# Patient Record
Sex: FEMALE | Race: WHITE
[De-identification: names, ages, dates, MRNs, and addresses within clinical notes are randomized per-mention and may not be internally consistent; named-entity substitution may affect disease eponyms.]

---

## 2020-11-15 ENCOUNTER — HOSPITAL ENCOUNTER (INPATIENT)
Dept: HOSPITAL 62 - ER | Age: 60
LOS: 4 days | Discharge: HOME | DRG: 291 | End: 2020-11-19
Attending: NURSE PRACTITIONER | Admitting: INTERNAL MEDICINE
Payer: MEDICARE

## 2020-11-15 DIAGNOSIS — I11.0: Primary | ICD-10-CM

## 2020-11-15 DIAGNOSIS — E87.6: ICD-10-CM

## 2020-11-15 DIAGNOSIS — Z88.0: ICD-10-CM

## 2020-11-15 DIAGNOSIS — Z23: ICD-10-CM

## 2020-11-15 DIAGNOSIS — Z71.6: ICD-10-CM

## 2020-11-15 DIAGNOSIS — J44.1: ICD-10-CM

## 2020-11-15 DIAGNOSIS — J96.01: ICD-10-CM

## 2020-11-15 DIAGNOSIS — Z79.02: ICD-10-CM

## 2020-11-15 DIAGNOSIS — F17.200: ICD-10-CM

## 2020-11-15 DIAGNOSIS — Z79.52: ICD-10-CM

## 2020-11-15 DIAGNOSIS — I50.31: ICD-10-CM

## 2020-11-15 DIAGNOSIS — Z79.899: ICD-10-CM

## 2020-11-15 DIAGNOSIS — Z79.51: ICD-10-CM

## 2020-11-15 DIAGNOSIS — E78.5: ICD-10-CM

## 2020-11-15 DIAGNOSIS — E87.1: ICD-10-CM

## 2020-11-15 LAB
ADD MANUAL DIFF: NO
ALBUMIN SERPL-MCNC: 3.5 G/DL (ref 3.5–5)
ALP SERPL-CCNC: 104 U/L (ref 38–126)
ANION GAP SERPL CALC-SCNC: 7 MMOL/L (ref 5–19)
APPEARANCE UR: CLEAR
APTT PPP: YELLOW S
ARTERIAL BLOOD FIO2: (no result)
ARTERIAL BLOOD H2CO3: 1.72 MMOL/L (ref 1.05–1.35)
ARTERIAL BLOOD HCO3: 25.2 MMOL/L (ref 20–24)
ARTERIAL BLOOD PCO2: 57 MMHG (ref 35–45)
ARTERIAL BLOOD PH: 7.26 (ref 7.35–7.45)
ARTERIAL BLOOD PO2: 68.5 MMHG (ref 80–100)
ARTERIAL BLOOD TOTAL CO2: 27 MMOL/L (ref 21–25)
AST SERPL-CCNC: 21 U/L (ref 14–36)
BARBITURATES UR QL SCN: NEGATIVE
BASE EXCESS BLDA CALC-SCNC: -2.6 MMOL/L
BASOPHILS # BLD AUTO: 0 10^3/UL (ref 0–0.2)
BASOPHILS NFR BLD AUTO: 0.2 % (ref 0–2)
BILIRUB DIRECT SERPL-MCNC: 0.2 MG/DL (ref 0–0.4)
BILIRUB SERPL-MCNC: 0.7 MG/DL (ref 0.2–1.3)
BILIRUB UR QL STRIP: NEGATIVE
BUN SERPL-MCNC: 11 MG/DL (ref 7–20)
CALCIUM: 8 MG/DL (ref 8.4–10.2)
CHLORIDE SERPL-SCNC: 88 MMOL/L (ref 98–107)
CK MB SERPL-MCNC: 2.05 NG/ML (ref ?–4.55)
CK MB SERPL-MCNC: 2.27 NG/ML (ref ?–4.55)
CO2 SERPL-SCNC: 27 MMOL/L (ref 22–30)
EOSINOPHIL # BLD AUTO: 0 10^3/UL (ref 0–0.6)
EOSINOPHIL NFR BLD AUTO: 0 % (ref 0–6)
ERYTHROCYTE [DISTWIDTH] IN BLOOD BY AUTOMATED COUNT: 17.1 % (ref 11.5–14)
GLUCOSE SERPL-MCNC: 180 MG/DL (ref 75–110)
GLUCOSE UR STRIP-MCNC: NEGATIVE MG/DL
HCT VFR BLD CALC: 37.1 % (ref 36–47)
HGB BLD-MCNC: 12.4 G/DL (ref 12–15.5)
INR PPP: 0.93
KETONES UR STRIP-MCNC: NEGATIVE MG/DL
LYMPHOCYTES # BLD AUTO: 0.5 10^3/UL (ref 0.5–4.7)
LYMPHOCYTES NFR BLD AUTO: 6.9 % (ref 13–45)
MCH RBC QN AUTO: 32.1 PG (ref 27–33.4)
MCHC RBC AUTO-ENTMCNC: 33.3 G/DL (ref 32–36)
MCV RBC AUTO: 96 FL (ref 80–97)
METHADONE UR QL SCN: NEGATIVE
MONOCYTES # BLD AUTO: 0.3 10^3/UL (ref 0.1–1.4)
MONOCYTES NFR BLD AUTO: 3.4 % (ref 3–13)
NEUTROPHILS # BLD AUTO: 7 10^3/UL (ref 1.7–8.2)
NEUTS SEG NFR BLD AUTO: 89.5 % (ref 42–78)
NT PRO BNP: (no result) PG/ML (ref ?–125)
PCP UR QL SCN: NEGATIVE
PH UR STRIP: 5 [PH] (ref 5–9)
PLATELET # BLD: 222 10^3/UL (ref 150–450)
POTASSIUM SERPL-SCNC: 5 MMOL/L (ref 3.6–5)
PROT SERPL-MCNC: 5.9 G/DL (ref 6.3–8.2)
PROT UR STRIP-MCNC: NEGATIVE MG/DL
PROTHROMBIN TIME: 12.7 SEC (ref 11.4–15.4)
RBC # BLD AUTO: 3.85 10^6/UL (ref 3.72–5.28)
SAO2 % BLDA: 90.8 % (ref 94–98)
SP GR UR STRIP: 1.01
TOTAL CELLS COUNTED % (AUTO): 100 %
TROPONIN I SERPL-MCNC: < 0.012 NG/ML
TROPONIN I SERPL-MCNC: < 0.012 NG/ML
URINE AMPHETAMINES SCREEN: NEGATIVE
URINE BENZODIAZEPINES SCREEN: NEGATIVE
URINE COCAINE SCREEN: NEGATIVE
URINE MARIJUANA (THC) SCREEN: NEGATIVE
UROBILINOGEN UR-MCNC: NEGATIVE MG/DL (ref ?–2)
WBC # BLD AUTO: 7.8 10^3/UL (ref 4–10.5)

## 2020-11-15 PROCEDURE — 83735 ASSAY OF MAGNESIUM: CPT

## 2020-11-15 PROCEDURE — 93306 TTE W/DOPPLER COMPLETE: CPT

## 2020-11-15 PROCEDURE — 85027 COMPLETE CBC AUTOMATED: CPT

## 2020-11-15 PROCEDURE — 85610 PROTHROMBIN TIME: CPT

## 2020-11-15 PROCEDURE — 81001 URINALYSIS AUTO W/SCOPE: CPT

## 2020-11-15 PROCEDURE — 84439 ASSAY OF FREE THYROXINE: CPT

## 2020-11-15 PROCEDURE — 83935 ASSAY OF URINE OSMOLALITY: CPT

## 2020-11-15 PROCEDURE — 82803 BLOOD GASES ANY COMBINATION: CPT

## 2020-11-15 PROCEDURE — 36600 WITHDRAWAL OF ARTERIAL BLOOD: CPT

## 2020-11-15 PROCEDURE — 93005 ELECTROCARDIOGRAM TRACING: CPT

## 2020-11-15 PROCEDURE — 80061 LIPID PANEL: CPT

## 2020-11-15 PROCEDURE — 84443 ASSAY THYROID STIM HORMONE: CPT

## 2020-11-15 PROCEDURE — 94660 CPAP INITIATION&MGMT: CPT

## 2020-11-15 PROCEDURE — 83880 ASSAY OF NATRIURETIC PEPTIDE: CPT

## 2020-11-15 PROCEDURE — 90471 IMMUNIZATION ADMIN: CPT

## 2020-11-15 PROCEDURE — 80048 BASIC METABOLIC PNL TOTAL CA: CPT

## 2020-11-15 PROCEDURE — 84484 ASSAY OF TROPONIN QUANT: CPT

## 2020-11-15 PROCEDURE — 82553 CREATINE MB FRACTION: CPT

## 2020-11-15 PROCEDURE — 93010 ELECTROCARDIOGRAM REPORT: CPT

## 2020-11-15 PROCEDURE — 87040 BLOOD CULTURE FOR BACTERIA: CPT

## 2020-11-15 PROCEDURE — 82550 ASSAY OF CK (CPK): CPT

## 2020-11-15 PROCEDURE — 80053 COMPREHEN METABOLIC PANEL: CPT

## 2020-11-15 PROCEDURE — G0008 ADMIN INFLUENZA VIRUS VAC: HCPCS

## 2020-11-15 PROCEDURE — 5A09357 ASSISTANCE WITH RESPIRATORY VENTILATION, LESS THAN 24 CONSECUTIVE HOURS, CONTINUOUS POSITIVE AIRWAY PRESSURE: ICD-10-PCS | Performed by: INTERNAL MEDICINE

## 2020-11-15 PROCEDURE — 99285 EMERGENCY DEPT VISIT HI MDM: CPT

## 2020-11-15 PROCEDURE — 84300 ASSAY OF URINE SODIUM: CPT

## 2020-11-15 PROCEDURE — 82962 GLUCOSE BLOOD TEST: CPT

## 2020-11-15 PROCEDURE — 80307 DRUG TEST PRSMV CHEM ANLYZR: CPT

## 2020-11-15 PROCEDURE — 85025 COMPLETE CBC W/AUTO DIFF WBC: CPT

## 2020-11-15 PROCEDURE — 83036 HEMOGLOBIN GLYCOSYLATED A1C: CPT

## 2020-11-15 PROCEDURE — 71045 X-RAY EXAM CHEST 1 VIEW: CPT

## 2020-11-15 PROCEDURE — 85730 THROMBOPLASTIN TIME PARTIAL: CPT

## 2020-11-15 PROCEDURE — 36415 COLL VENOUS BLD VENIPUNCTURE: CPT

## 2020-11-15 PROCEDURE — 90686 IIV4 VACC NO PRSV 0.5 ML IM: CPT

## 2020-11-15 PROCEDURE — 96374 THER/PROPH/DIAG INJ IV PUSH: CPT

## 2020-11-15 RX ADMIN — FUROSEMIDE SCH MG: 10 INJECTION, SOLUTION INTRAMUSCULAR; INTRAVENOUS at 22:53

## 2020-11-15 RX ADMIN — IPRATROPIUM BROMIDE AND ALBUTEROL SULFATE SCH ML: 2.5; .5 SOLUTION RESPIRATORY (INHALATION) at 19:17

## 2020-11-15 RX ADMIN — FAMOTIDINE SCH MG: 20 TABLET, FILM COATED ORAL at 22:54

## 2020-11-15 RX ADMIN — AZITHROMYCIN MONOHYDRATE SCH MLS/HR: 500 INJECTION, POWDER, LYOPHILIZED, FOR SOLUTION INTRAVENOUS at 22:52

## 2020-11-15 NOTE — RADIOLOGY REPORT (SQ)
EXAM DESCRIPTION:  CHEST SINGLE VIEW



IMAGES COMPLETED DATE/TIME:  11/15/2020 2:06 pm



REASON FOR STUDY:  Shortness of breath



COMPARISON:  None.



EXAM PARAMETERS:  NUMBER OF VIEWS: One view.

TECHNIQUE: Single frontal radiographic view of the chest acquired.

RADIATION DOSE: NA

LIMITATIONS: Rotated towards the [place] orientation



FINDINGS:  LUNGS AND PLEURA: Few Kerley lines are present at both lung bases with pulmonary vascular 
congestion from mild fluid overload or congestive failure.

No gross pleural effusions.  No pneumothorax.

MEDIASTINUM AND HILAR STRUCTURES: No masses.  Contour normal.

HEART AND VASCULAR STRUCTURES: Mild cardiomegaly

BONES: No acute findings.

HARDWARE: None in the chest.

OTHER: No other significant finding.



IMPRESSION:  Mild interstitial pulmonary edema



TECHNICAL DOCUMENTATION:  JOB ID:  [id number]

 2011 [email or web address] All Rights Reserved



Reading location - IP/workstation name: [id number]

## 2020-11-15 NOTE — ER DOCUMENT REPORT
ED Respiratory Problem





- General


Chief Complaint: Respiratory Distress


Stated Complaint: RESPIRATORY DISTRESS


Mode of Arrival: Medic


Information source: Patient


Notes: 





This 60-year-old woman presents to the emergency department history of EMS 

called to the hotel where she was staying because of increasing shortness of 

breath and work to breathe.  EMS notes that the patient was in marked distress 

unable to maintain an oxygen saturation with O2, she was placed on CPAP at given

nebulizer treatments, Solu-Medrol 125, magnesium 2 g IV and transported to the 

emergency department.  Upon arrival to the emergency department the patient 

continued to have some increased work of breathing, she was placed on BiPAP and 

further evaluation and treatment performed.





- Related Data


Allergies/Adverse Reactions: 


                                        





Penicillins Allergy (Verified 11/15/20 14:24)


   











Past Medical History





- Social History


Smoking Status: Current Every Day Smoker


Family History: Reviewed & Not Pertinent


Pulmonary Medical History: Reports: Hx COPD





Review of Systems





- Review of Systems


-: Yes ROS unobtainable due to patient's medical condition - Poorly responsive, 

altered.





Physical Exam





- Vital signs


Vitals: 


                                        











Resp Pulse Ox


 


 18   94 


 


 11/15/20 13:40  11/15/20 13:40














- Notes


Notes: 





PHYSICAL EXAMINATION:


 


Physical Exam:


General: Ill-appearing 60-year-old woman in mild distress secondary to shortness

of breath


HEENT: NC/AT, pupils equal round and reactive to light, MM moist,nares clear, 

oropharynx clear, airway patent


Neck: supple, no adenopathy, no masses.  Good range of motion


Lungs: Mild wheezing diffusely


CVS: Tachycardic rate and rhythm no murmur gallop or rub


Abdomen: Soft, active, nontender, no masses, no hepatosplenomegaly


Ext:   Trace edema, no clubbing or cyanosis.


Neuro: Obtunded, opens eyes to name being called, does not answer questions


Skin: Intact no open lesions, no rash











Course





- Re-evaluation


Re-evalutation: 





11/15/20 18:16


Patient presented to with via EMS with respiratory distress and hypoxia.  Found 

to be hypercapnic and acidotic patient has received IV Solu-Medrol, IV 

magnesium, nebulizer treatments x3, and has improved while on BiPAP in the 

emergency department.  Chest x-ray reveals a mild congestive heart failure BNP 

is elevated she is given Lasix 20 mg IV.  This may be due to pulmonary hyperte

nsion and right heart strain, however, the patient will be brought into the 

hospital for further evaluation and treatment.





I discussed the patient with the hospitalist, Dr. Phillips will admit the patient 

for further evaluation and treatment to the [facility].





- Vital Signs


Vital signs: 


                                        











Temp Pulse Resp BP Pulse Ox


 


 97.6 F      19   133/72 H  97 


 


 11/15/20 14:13     11/15/20 17:16  11/15/20 16:16  11/15/20 17:16














- Laboratory


Result Diagrams: 


                                 11/15/20 13:57





                                 11/15/20 13:57


Laboratory results interpreted by me: 


                                        











  11/15/20 11/15/20 11/15/20





  13:46 13:57 13:57


 


RDW   17.1 H 


 


Lymph % (Auto)   6.9 L 


 


Seg Neutrophils %   89.5 H 


 


Carbonic Acid  1.72 H  


 


ABG pH  7.26 L  


 


ABG pCO2  57.0 H  


 


ABG pO2  68.5 L  


 


ABG HCO3  25.2 H  


 


ABG Total CO2  27.0 H  


 


ABG O2 Saturation  90.8 L  


 


Sodium    122.3 L


 


Chloride    88 L


 


Glucose    180 H


 


POC Glucose   


 


Calcium    8.0 L


 


NT-Pro-B Natriuret Pep   


 


Total Protein    5.9 L


 


Urine Blood   














  11/15/20 11/15/20 11/15/20





  14:01 16:00 16:41


 


RDW   


 


Lymph % (Auto)   


 


Seg Neutrophils %   


 


Carbonic Acid   


 


ABG pH   


 


ABG pCO2   


 


ABG pO2   


 


ABG HCO3   


 


ABG Total CO2   


 


ABG O2 Saturation   


 


Sodium   


 


Chloride   


 


Glucose   


 


POC Glucose  184 H  


 


Calcium   


 


NT-Pro-B Natriuret Pep    00880 H


 


Total Protein   


 


Urine Blood   SMALL H 











11/15/20 18:13


I have reviewed laboratory data and used this information for the treatment 

decisions regarding the patient.





- Diagnostic Test


Radiology reviewed: Image reviewed, Reports reviewed


Radiology results interpreted by me: 





11/15/20 18:13





                                        





Chest X-Ray  11/15/20 13:49


IMPRESSION:  Mild interstitial pulmonary edema


 














Critical Care Note





- Critical Care Note


Total time excluding time spent on procedures (mins): 60 - Critical care time 

spent obtaining history from patient or surrogate, discussions with consultants,

development of treatment plan with patient or surrogate, evaluation of patient's

response to treatment, examination of patient, ordering and performing 

treatments and interventions, ordering and review of laboratory studies, re-

evaluation of patient's condition, ordering and review of radiographic studies 

and review of old charts





Discharge





- Discharge


Clinical Impression: 


Respiratory failure


Qualifiers:


 Chronicity: acute Respiratory failure complication: hypoxia and hypercapnia 

Qualified Code(s): J96.01 - Acute respiratory failure with hypoxia; J96.02 - 

Acute respiratory failure with hypercapnia





COPD (chronic obstructive pulmonary disease)


Qualifiers:


 COPD type: COPD with acute exacerbation Qualified Code(s): J44.1 - Chronic 

obstructive pulmonary disease with (acute) exacerbation





CHF (congestive heart failure)


Qualifiers:


 Heart failure type: unspecified Heart failure chronicity: unspecified Qualified

Code(s): I50.9 - Heart failure, unspecified





Condition: Good


Disposition: ADMITTED AS INPATIENT


Admitting Provider: Alan (Hospitalist)


Unit Admitted: [facility]

## 2020-11-15 NOTE — PDOC H&P
History of Present Illness


History of Present Illness: 


ALBERT RICKETTS is a 60 year old female past medical history of COPD, who was 

brought to ED by EMS from hospital  where she is staying with her sister, after

noted to be short of breath, unfortunately  patient poor historian, does not 

provide much information except for that she is moving to [place], and she has 

COPD and history of drug abuse, she denies any cardiac history, and she was 

bought to ED because she was short of breath, as per EMS report patient was 

noted to be in moderate respiratory distress, well to maintain her O2, she was 

placed on CPAP and given nebulizer treatment, started on Solu-Medrol and IV 

magnesium, and brought to ED.  In ED she was noted to have mild hypercarbia and 

hypoxemia, with elevated proBNP this x-ray showed mild interstitial pulmonary 

edema.  Patient denies any fever, chills, chest pain, nausea, vomiting, 

diarrhea, constipation, exposure to anybody with COVID-19 infection.  Noted to 

have elevated proBNP however denies any history of CHF.





Past Medical History


Pulmonary Medical History: Reports: Chronic Obstructive Pulmonary Disease (COPD)





Social History


Smoking Status: Current Every Day Smoker





Family History


Family History: Reviewed & Not Pertinent


Parental Family History Reviewed: Yes


Children Family History Reviewed: Yes


Sibling(s) Family History Reviewed.: Yes





Medication/Allergy


Allergies/Adverse Reactions: 


                                        





Penicillins Allergy (Verified 11/15/20 14:24)


   











Review of Systems


Review of Systems: 


as per hpi





Physical Exam


Vital Signs: 


                                        











Temp Pulse Resp BP Pulse Ox


 


 97.6 F      25 H  149/76 H  98 


 


 11/15/20 14:13     11/15/20 18:16  11/15/20 18:16  11/15/20 18:16











General appearance: PRESENT: other - Wearing BiPAP, moderate respiratory 

distress.


Head exam: PRESENT: atraumatic, normocephalic


Respiratory exam: PRESENT: accessory muscle use, decreased breath sounds, 

prolonged expiratory phas, symmetrical, tachypnea.  ABSENT: rales, rhonchi, 

wheezes


Cardiovascular exam: PRESENT: RRR.  ABSENT: diastolic murmur, rubs, systolic 

murmur


GI/Abdominal exam: PRESENT: normal bowel sounds, soft.  ABSENT: distended, 

guarding, mass, organolmegaly, rebound, tenderness


Extremities exam: PRESENT: full ROM.  ABSENT: calf tenderness, clubbing, pedal 

edema


Neurological exam: PRESENT: alert, awake, oriented to person, oriented to place,

CN II-XII grossly intact.  ABSENT: motor sensory deficit


Skin exam: PRESENT: dry, intact, warm.  ABSENT: cyanosis, rash





Results


Laboratory Results: 


                                        





                                 11/15/20 13:57 





                                 11/15/20 13:57 





                                        











  11/15/20 11/15/20 11/15/20





  13:46 13:57 13:57


 


WBC   7.8 


 


RBC   3.85 


 


Hgb   12.4 


 


Hct   37.1 


 


MCV   96 


 


MCH   32.1 


 


MCHC   33.3 


 


RDW   17.1 H 


 


Plt Count   222 


 


Seg Neutrophils %   89.5 H 


 


Carbonic Acid  1.72 H  


 


HCO3/H2CO3 Ratio  14:1  


 


ABG pH  7.26 L  


 


ABG pCO2  57.0 H  


 


ABG pO2  68.5 L  


 


ABG HCO3  25.2 H  


 


ABG O2 Saturation  90.8 L  


 


ABG Base Excess  -2.6  


 


FiO2  60%  


 


Sodium    122.3 L


 


Potassium    5.0


 


Chloride    88 L


 


Carbon Dioxide    27


 


Anion Gap    7


 


BUN    11


 


Creatinine    0.92


 


Est GFR ( Amer)    > 60


 


Glucose    180 H


 


Calcium    8.0 L


 


Total Bilirubin    0.7


 


AST    21


 


Alkaline Phosphatase    104


 


Total Protein    5.9 L


 


Albumin    3.5


 


Urine Color   


 


Urine Appearance   


 


Urine pH   


 


Ur Specific Gravity   


 


Urine Protein   


 


Urine Glucose (UA)   


 


Urine Ketones   


 


Urine Blood   


 


Urine RBC (Auto)   














  11/15/20





  16:00


 


WBC 


 


RBC 


 


Hgb 


 


Hct 


 


MCV 


 


MCH 


 


MCHC 


 


RDW 


 


Plt Count 


 


Seg Neutrophils % 


 


Carbonic Acid 


 


HCO3/H2CO3 Ratio 


 


ABG pH 


 


ABG pCO2 


 


ABG pO2 


 


ABG HCO3 


 


ABG O2 Saturation 


 


ABG Base Excess 


 


FiO2 


 


Sodium 


 


Potassium 


 


Chloride 


 


Carbon Dioxide 


 


Anion Gap 


 


BUN 


 


Creatinine 


 


Est GFR (African Amer) 


 


Glucose 


 


Calcium 


 


Total Bilirubin 


 


AST 


 


Alkaline Phosphatase 


 


Total Protein 


 


Albumin 


 


Urine Color  YELLOW


 


Urine Appearance  CLEAR


 


Urine pH  5.0


 


Ur Specific Gravity  1.010


 


Urine Protein  NEGATIVE


 


Urine Glucose (UA)  NEGATIVE


 


Urine Ketones  NEGATIVE


 


Urine Blood  SMALL H


 


Urine RBC (Auto)  1








                                        











  11/15/20 11/15/20 11/15/20





  13:57 13:57 16:41


 


Creatine Kinase  66   64


 


CK-MB (CK-2)   2.05 


 


Troponin I   < 0.012 


 


NT-Pro-B Natriuret Pep   














  11/15/20





  16:41


 


Creatine Kinase 


 


CK-MB (CK-2)  2.27


 


Troponin I  < 0.012


 


NT-Pro-B Natriuret Pep  67567 H











Impressions: 


                                        





Chest X-Ray  11/15/20 13:49


IMPRESSION:  Mild interstitial pulmonary edema


 














Assessment and Plan





- Diagnosis


(1) Acute respiratory failure with hypoxemia


Is this a current diagnosis for this admission?: Yes   


Plan: 


Presented with moderate respiratory distress ABG positive for mild hypercarbia 

and hypoxia.


Revealed elevated proBNP.


Denies any underlying cardiac history.


Endorses history of nonoxygen dependent COPD.


Endorses history of heavy tobacco abuse.


Chest x-ray positive for interstitial edema.





Likely due to acute COPD and CHF exacerbation.  





Admit to IMCU, BiPAP, DuoNeb, azithromycin, IV Solu-Medrol, flutter valve, 

incentive spirometry.








(2) Acute CHF


Qualifiers: 


   Heart failure type: diastolic   Qualified Code(s): I50.31 - Acute diastolic 

(congestive) heart failure   


Is this a current diagnosis for this admission?: Yes   


Plan: 


Denies any cardiac history.  Given history of COPD and EtOH abuse likely right-

sided heart failure.


Elevated proBNP, EKG and no acute EKG changes, troponins WNL.





Admit to telemetry, 2D echo, cardiac diet, strict renal, IV Lasix, fluid 

restriction, daily weight, ACE beta-blockers.








(3) Hyponatremia


Is this a current diagnosis for this admission?: Yes   


Plan: 


Hypervolemic hyponatremia.


Likely chronic, due to underlying acute CHF exacerbation.


Fluid restriction, treat underlying CHF.  Monitor procedure.  Daily BMP.








(4) Acute exacerbation of chronic obstructive pulmonary disease (COPD)


Is this a current diagnosis for this admission?: Yes   


Plan: 


Plan as per #1.








(5) COPD (chronic obstructive pulmonary disease)


Qualifiers: 


   COPD type: COPD with acute exacerbation   Qualified Code(s): J44.1 - Chronic 

obstructive pulmonary disease with (acute) exacerbation   


Is this a current diagnosis for this admission?: Yes   





(6) Tobacco abuse


Is this a current diagnosis for this admission?: Yes   


Plan: 


Counseled on quitting.  NicoDerm patch will be provided.








- Time


Time Spent with patient: 35 or more minutes


Anticipated Discharge Disposition: Home with Home Health


Anticipated Discharge Timeframe: within 72 hours

## 2020-11-16 LAB
ADD MANUAL DIFF: NO
ALBUMIN SERPL-MCNC: 4.1 G/DL (ref 3.5–5)
ALP SERPL-CCNC: 109 U/L (ref 38–126)
ANION GAP SERPL CALC-SCNC: 7 MMOL/L (ref 5–19)
ARTERIAL BLOOD FIO2: (no result)
ARTERIAL BLOOD H2CO3: 0.98 MMOL/L (ref 1.05–1.35)
ARTERIAL BLOOD HCO3: 30.3 MMOL/L (ref 20–24)
ARTERIAL BLOOD PCO2: 32.7 MMHG (ref 35–45)
ARTERIAL BLOOD PH: 7.59 (ref 7.35–7.45)
ARTERIAL BLOOD PO2: 272.9 MMHG (ref 80–100)
ARTERIAL BLOOD TOTAL CO2: 31.3 MMOL/L (ref 21–25)
AST SERPL-CCNC: 19 U/L (ref 14–36)
BASE EXCESS BLDA CALC-SCNC: 8.5 MMOL/L
BASOPHILS # BLD AUTO: 0 10^3/UL (ref 0–0.2)
BASOPHILS NFR BLD AUTO: 0.2 % (ref 0–2)
BILIRUB DIRECT SERPL-MCNC: 0.3 MG/DL (ref 0–0.4)
BILIRUB SERPL-MCNC: 0.9 MG/DL (ref 0.2–1.3)
BUN SERPL-MCNC: 15 MG/DL (ref 7–20)
CALCIUM: 9.1 MG/DL (ref 8.4–10.2)
CHLORIDE SERPL-SCNC: 89 MMOL/L (ref 98–107)
CHOLEST SERPL-MCNC: 107.7 MG/DL (ref 0–200)
CO2 SERPL-SCNC: 33 MMOL/L (ref 22–30)
EOSINOPHIL # BLD AUTO: 0 10^3/UL (ref 0–0.6)
EOSINOPHIL NFR BLD AUTO: 0 % (ref 0–6)
ERYTHROCYTE [DISTWIDTH] IN BLOOD BY AUTOMATED COUNT: 17.1 % (ref 11.5–14)
FREE T4 (FREE THYROXINE): 1.44 NG/DL (ref 0.78–2.19)
GLUCOSE SERPL-MCNC: 133 MG/DL (ref 75–110)
HCT VFR BLD CALC: 38.4 % (ref 36–47)
HGB BLD-MCNC: 13.3 G/DL (ref 12–15.5)
LDLC SERPL DIRECT ASSAY-MCNC: < 30 MG/DL (ref ?–100)
LYMPHOCYTES # BLD AUTO: 0.4 10^3/UL (ref 0.5–4.7)
LYMPHOCYTES NFR BLD AUTO: 6.4 % (ref 13–45)
MCH RBC QN AUTO: 32.5 PG (ref 27–33.4)
MCHC RBC AUTO-ENTMCNC: 34.5 G/DL (ref 32–36)
MCV RBC AUTO: 94 FL (ref 80–97)
MONOCYTES # BLD AUTO: 0.3 10^3/UL (ref 0.1–1.4)
MONOCYTES NFR BLD AUTO: 4.4 % (ref 3–13)
NEUTROPHILS # BLD AUTO: 5.5 10^3/UL (ref 1.7–8.2)
NEUTS SEG NFR BLD AUTO: 89 % (ref 42–78)
PLATELET # BLD: 227 10^3/UL (ref 150–450)
POTASSIUM SERPL-SCNC: 4.3 MMOL/L (ref 3.6–5)
PROT SERPL-MCNC: 6.7 G/DL (ref 6.3–8.2)
RBC # BLD AUTO: 4.08 10^6/UL (ref 3.72–5.28)
SAO2 % BLDA: 99.7 % (ref 94–98)
TOTAL CELLS COUNTED % (AUTO): 100 %
TRIGL SERPL-MCNC: 111 MG/DL (ref ?–150)
TSH SERPL-ACNC: 0.33 UIU/ML (ref 0.47–4.68)
VLDLC SERPL CALC-MCNC: 22.2 MG/DL (ref 10–31)
WBC # BLD AUTO: 6.1 10^3/UL (ref 4–10.5)

## 2020-11-16 RX ADMIN — OXYCODONE AND ACETAMINOPHEN PRN TAB: 5; 325 TABLET ORAL at 02:29

## 2020-11-16 RX ADMIN — DOCUSATE SODIUM SCH MG: 100 CAPSULE, LIQUID FILLED ORAL at 09:48

## 2020-11-16 RX ADMIN — CLOPIDOGREL BISULFATE SCH MG: 75 TABLET, FILM COATED ORAL at 17:01

## 2020-11-16 RX ADMIN — AZITHROMYCIN MONOHYDRATE SCH MLS/HR: 500 INJECTION, POWDER, LYOPHILIZED, FOR SOLUTION INTRAVENOUS at 22:10

## 2020-11-16 RX ADMIN — FAMOTIDINE SCH MG: 20 TABLET, FILM COATED ORAL at 22:13

## 2020-11-16 RX ADMIN — FAMOTIDINE SCH MG: 20 TABLET, FILM COATED ORAL at 09:48

## 2020-11-16 RX ADMIN — IPRATROPIUM BROMIDE AND ALBUTEROL SULFATE SCH ML: 2.5; .5 SOLUTION RESPIRATORY (INHALATION) at 08:20

## 2020-11-16 RX ADMIN — OXYCODONE AND ACETAMINOPHEN PRN TAB: 5; 325 TABLET ORAL at 20:08

## 2020-11-16 RX ADMIN — ATORVASTATIN CALCIUM SCH MG: 40 TABLET, FILM COATED ORAL at 17:01

## 2020-11-16 RX ADMIN — METHYLPREDNISOLONE SODIUM SUCCINATE SCH MG: 40 INJECTION, POWDER, FOR SOLUTION INTRAMUSCULAR; INTRAVENOUS at 02:29

## 2020-11-16 RX ADMIN — IPRATROPIUM BROMIDE AND ALBUTEROL SULFATE SCH ML: 2.5; .5 SOLUTION RESPIRATORY (INHALATION) at 14:25

## 2020-11-16 RX ADMIN — METHYLPREDNISOLONE SODIUM SUCCINATE SCH MG: 40 INJECTION, POWDER, FOR SOLUTION INTRAMUSCULAR; INTRAVENOUS at 09:47

## 2020-11-16 RX ADMIN — METHYLPREDNISOLONE SODIUM SUCCINATE SCH MG: 40 INJECTION, POWDER, FOR SOLUTION INTRAMUSCULAR; INTRAVENOUS at 17:01

## 2020-11-16 RX ADMIN — FUROSEMIDE SCH MG: 10 INJECTION, SOLUTION INTRAMUSCULAR; INTRAVENOUS at 09:47

## 2020-11-16 RX ADMIN — ENOXAPARIN SODIUM SCH MG: 40 INJECTION SUBCUTANEOUS at 09:48

## 2020-11-16 RX ADMIN — IPRATROPIUM BROMIDE AND ALBUTEROL SULFATE SCH ML: 2.5; .5 SOLUTION RESPIRATORY (INHALATION) at 20:54

## 2020-11-16 RX ADMIN — PAROXETINE HYDROCHLORIDE SCH MG: 20 TABLET, FILM COATED ORAL at 17:01

## 2020-11-16 RX ADMIN — FUROSEMIDE SCH MG: 10 INJECTION, SOLUTION INTRAMUSCULAR; INTRAVENOUS at 22:12

## 2020-11-16 RX ADMIN — FLUTICASONE FUROATE, UMECLIDINIUM BROMIDE AND VILANTEROL TRIFENATATE SCH PUFF: 100; 62.5; 25 POWDER RESPIRATORY (INHALATION) at 09:49

## 2020-11-16 NOTE — PDOC PROGRESS REPORT
Subjective


Date:: 11/16/20


Subjective:: 


ALBERT RICKETTS is a 60 year old female past medical history of COPD, who was 

brought to ED by EMS from hospital  where she is staying with her sister, after

noted to be short of breath, unfortunately  patient poor historian, does not 

provide much information except for that she is moving to [place], and she has 

COPD and history of drug abuse, she denies any cardiac history, and she was 

bought to ED because she was short of breath, as per EMS report patient was 

noted to be in moderate respiratory distress, well to maintain her O2, she was 

placed on CPAP and given nebulizer treatment, started on Solu-Medrol and IV 

magnesium, and brought to ED.  In ED she was noted to have mild hypercarbia and 

hypoxemia, with elevated proBNP this x-ray showed mild interstitial pulmonary 

edema.  Patient denies any fever, chills, chest pain, nausea, vomiting, 

diarrhea, constipation, exposure to anybody with COVID-19 infection.  Noted to 

have elevated proBNP however denies any history of CHF.





11/16/2020.  No acute events overnight.  Significant improvement of respiratory 

symptoms, and is awake alert and oriented, but with physical examination, denies

being exposed to anybody with COVID-19 infection, stating that she is adherent 

with Covid precautions, denies any previous history of CAD or CHF but review of 

home medication shows that patient is on antiplatelets, ACE, beta-blockers, 

diuretics, and statins. She does endorse bilateral lower extremity swelling 

occasionally, endorses history of tobacco abuse, denies any fever, chills, 

nausea, vomiting, diarrhea, constipation or any urinary symptoms.


Reason For Visit: 


RESPIRATORY FAILURE,COPD(CHRONIC OBSTRUCTIVE PULMO








Physical Exam


Vital Signs: 


                                        











Temp Pulse Resp BP Pulse Ox


 


 97.9 F   93   14   149/88 H  92 


 


 11/16/20 12:34  11/16/20 14:25  11/16/20 14:25  11/16/20 12:34  11/16/20 12:34








                                 Intake & Output











 11/15/20 11/16/20 11/17/20





 06:59 06:59 06:59


 


Intake Total  250 


 


Output Total   1000


 


Balance  250 -1000


 


Weight   54.3 kg











General appearance: PRESENT: no acute distress, well-developed, well-nourished


Head exam: PRESENT: atraumatic, normocephalic


Respiratory exam: PRESENT: crackles.  ABSENT: rales, rhonchi, wheezes


GI/Abdominal exam: PRESENT: normal bowel sounds, soft.  ABSENT: distended, 

guarding, mass, organolmegaly, rebound, tenderness


Extremities exam: PRESENT: full ROM.  ABSENT: calf tenderness, clubbing, pedal 

edema


Neurological exam: PRESENT: alert, awake, oriented to person, oriented to place,

oriented to time, oriented to situation, CN II-XII grossly intact.  ABSENT: 

motor sensory deficit





Results


Laboratory Results: 


                                        





                                 11/16/20 04:22 





                                 11/16/20 04:22 





                                        











  11/15/20 11/16/20 11/16/20





  16:00 04:22 04:22


 


WBC   6.1 


 


RBC   4.08 


 


Hgb   13.3 


 


Hct   38.4 


 


MCV   94 


 


MCH   32.5 


 


MCHC   34.5 


 


RDW   17.1 H 


 


Plt Count   227 


 


Seg Neutrophils %   89.0 H 


 


Carbonic Acid   


 


HCO3/H2CO3 Ratio   


 


ABG pH   


 


ABG pCO2   


 


ABG pO2   


 


ABG HCO3   


 


ABG O2 Saturation   


 


ABG Base Excess   


 


FiO2   


 


Sodium    129.1 L


 


Potassium    4.3


 


Chloride    89 L


 


Carbon Dioxide    33 H


 


Anion Gap    7


 


BUN    15


 


Creatinine    0.83


 


Est GFR ( Amer)    > 60


 


Glucose    133 H


 


Calcium    9.1


 


Magnesium    2.0


 


Total Bilirubin    0.9


 


AST    19


 


Alkaline Phosphatase    109


 


Total Protein    6.7


 


Albumin    4.1


 


Triglycerides    111


 


Cholesterol    107.70


 


LDL Cholesterol Direct    < 30


 


VLDL Cholesterol    22.2


 


HDL Cholesterol    63


 


TSH   


 


Free T4   


 


Urine Color  YELLOW  


 


Urine Appearance  CLEAR  


 


Urine pH  5.0  


 


Ur Specific Gravity  1.010  


 


Urine Protein  NEGATIVE  


 


Urine Glucose (UA)  NEGATIVE  


 


Urine Ketones  NEGATIVE  


 


Urine Blood  SMALL H  


 


Urine RBC (Auto)  1  














  11/16/20 11/16/20





  04:22 09:50


 


WBC  


 


RBC  


 


Hgb  


 


Hct  


 


MCV  


 


MCH  


 


MCHC  


 


RDW  


 


Plt Count  


 


Seg Neutrophils %  


 


Carbonic Acid   0.98 L


 


HCO3/H2CO3 Ratio   30:1


 


ABG pH   7.59 H


 


ABG pCO2   32.7 L


 


ABG pO2   272.9 H


 


ABG HCO3   30.3 H


 


ABG O2 Saturation   99.7 H


 


ABG Base Excess   8.5


 


FiO2   60%


 


Sodium  


 


Potassium  


 


Chloride  


 


Carbon Dioxide  


 


Anion Gap  


 


BUN  


 


Creatinine  


 


Est GFR (African Amer)  


 


Glucose  


 


Calcium  


 


Magnesium  


 


Total Bilirubin  


 


AST  


 


Alkaline Phosphatase  


 


Total Protein  


 


Albumin  


 


Triglycerides  


 


Cholesterol  


 


LDL Cholesterol Direct  


 


VLDL Cholesterol  


 


HDL Cholesterol  


 


TSH  0.33 L 


 


Free T4  1.44 


 


Urine Color  


 


Urine Appearance  


 


Urine pH  


 


Ur Specific Gravity  


 


Urine Protein  


 


Urine Glucose (UA)  


 


Urine Ketones  


 


Urine Blood  


 


Urine RBC (Auto)  








                                        











  11/15/20 11/15/20 11/15/20





  13:57 13:57 16:41


 


Creatine Kinase  66   64


 


CK-MB (CK-2)   2.05 


 


Troponin I   < 0.012 


 


NT-Pro-B Natriuret Pep   














  11/15/20





  16:41


 


Creatine Kinase 


 


CK-MB (CK-2)  2.27


 


Troponin I  < 0.012


 


NT-Pro-B Natriuret Pep  64223 H











Impressions: 


                                        





Chest X-Ray  11/15/20 13:49


IMPRESSION:  Mild interstitial pulmonary edema


 














Assessment and Plan





- Diagnosis


(1) Acute respiratory failure with hypoxemia


Is this a current diagnosis for this admission?: Yes   


Plan: 


Moderate improvement.  ABG shows resolution of hypoxemia with respiratory 

alkalosis.


Presented with moderate respiratory distress ABG positive for mild hypercarbia 

and hypoxemia.  





Presented with elevated proBNP no previous labs available.


Denies any underlying cardiac history.


Endorses history of nonoxygen dependent COPD.


Endorses history of heavy tobacco abuse.


Denies any exposure to anybody with COVID-19 infection.


Chest x-ray positive for interstitial edema.





Likely due to acute COPD and CHF exacerbation.  





Continue BiPAP, DuoNeb, azithromycin, IV Solu-Medrol, flutter valve, incentive 

spirometry.








(2) Acute CHF


Qualifiers: 


   Heart failure type: diastolic   Qualified Code(s): I50.31 - Acute diastolic 

(congestive) heart failure   


Is this a current diagnosis for this admission?: Yes   


Plan: 


Denies any history of CAD but home meds are antiplatelets, ACE, beta-blockers, 

diuretics, and statins





Presented with elevated proBNP, EKG and no acute EKG changes, troponins WNL.





Continue telemetry, strict in and out, cardiac diet, fluid restriction, daily 

weight.  





Continue ACE inhibitors uptitrate as possible.  


Continue beta-blockers.  


Continue IV Lasix.  


Follow-up 2D echo result.  


Consult cardiology if indicated. 








(3) Hyponatremia


Is this a current diagnosis for this admission?: Yes   


Plan: 


Improving.  


Hypervolemic hypoosmotic hyponatremia.


Likely chronic, due to underlying acute CHF exacerbation.


Continue fluid restriction, treat underlying CHF.  Monitor procedure.  Daily 

BMP.








(4) Acute exacerbation of chronic obstructive pulmonary disease (COPD)


Is this a current diagnosis for this admission?: Yes   


Plan: 


History of nonoxygen dependent COPD.  Unfortunately still smokes.  


Plan as per #1.








(5) COPD (chronic obstructive pulmonary disease)


Qualifiers: 


   COPD type: COPD with acute exacerbation   Qualified Code(s): J44.1 - Chronic 

obstructive pulmonary disease with (acute) exacerbation   


Is this a current diagnosis for this admission?: Yes   


Plan: 


Plan as per above.








(6) Tobacco abuse


Is this a current diagnosis for this admission?: Yes   


Plan: 


Counseled on quitting.  NicoDerm patch will be provided.








- Time


Time Spent with patient: 35 or more minutes


Anticipated Discharge Disposition: Home, Self Care


Anticipated Discharge Timeframe: within 48 hours

## 2020-11-17 LAB
ALBUMIN SERPL-MCNC: 4.1 G/DL (ref 3.5–5)
ALP SERPL-CCNC: 92 U/L (ref 38–126)
ANION GAP SERPL CALC-SCNC: 10 MMOL/L (ref 5–19)
AST SERPL-CCNC: 19 U/L (ref 14–36)
BILIRUB DIRECT SERPL-MCNC: 0.5 MG/DL (ref 0–0.4)
BILIRUB SERPL-MCNC: 1.2 MG/DL (ref 0.2–1.3)
BUN SERPL-MCNC: 29 MG/DL (ref 7–20)
CALCIUM: 9.2 MG/DL (ref 8.4–10.2)
CHLORIDE SERPL-SCNC: 85 MMOL/L (ref 98–107)
CO2 SERPL-SCNC: 34 MMOL/L (ref 22–30)
GLUCOSE SERPL-MCNC: 126 MG/DL (ref 75–110)
POTASSIUM SERPL-SCNC: 3.9 MMOL/L (ref 3.6–5)
PROT SERPL-MCNC: 6.8 G/DL (ref 6.3–8.2)

## 2020-11-17 RX ADMIN — IPRATROPIUM BROMIDE AND ALBUTEROL SULFATE SCH ML: 2.5; .5 SOLUTION RESPIRATORY (INHALATION) at 07:41

## 2020-11-17 RX ADMIN — IPRATROPIUM BROMIDE AND ALBUTEROL SULFATE SCH ML: 2.5; .5 SOLUTION RESPIRATORY (INHALATION) at 13:55

## 2020-11-17 RX ADMIN — METHYLPREDNISOLONE SODIUM SUCCINATE SCH MG: 40 INJECTION, POWDER, FOR SOLUTION INTRAMUSCULAR; INTRAVENOUS at 10:22

## 2020-11-17 RX ADMIN — FAMOTIDINE SCH MG: 20 TABLET, FILM COATED ORAL at 21:09

## 2020-11-17 RX ADMIN — CLOPIDOGREL BISULFATE SCH MG: 75 TABLET, FILM COATED ORAL at 10:21

## 2020-11-17 RX ADMIN — PAROXETINE HYDROCHLORIDE SCH MG: 20 TABLET, FILM COATED ORAL at 10:20

## 2020-11-17 RX ADMIN — METOPROLOL SUCCINATE SCH MG: 25 TABLET, EXTENDED RELEASE ORAL at 10:21

## 2020-11-17 RX ADMIN — ENOXAPARIN SODIUM SCH MG: 40 INJECTION SUBCUTANEOUS at 10:22

## 2020-11-17 RX ADMIN — METHYLPREDNISOLONE SODIUM SUCCINATE SCH MG: 40 INJECTION, POWDER, FOR SOLUTION INTRAMUSCULAR; INTRAVENOUS at 02:41

## 2020-11-17 RX ADMIN — OXYCODONE AND ACETAMINOPHEN PRN TAB: 5; 325 TABLET ORAL at 20:03

## 2020-11-17 RX ADMIN — METHYLPREDNISOLONE SODIUM SUCCINATE SCH MG: 40 INJECTION, POWDER, FOR SOLUTION INTRAMUSCULAR; INTRAVENOUS at 17:48

## 2020-11-17 RX ADMIN — ATORVASTATIN CALCIUM SCH MG: 40 TABLET, FILM COATED ORAL at 10:22

## 2020-11-17 RX ADMIN — FAMOTIDINE SCH MG: 20 TABLET, FILM COATED ORAL at 10:20

## 2020-11-17 RX ADMIN — FLUTICASONE FUROATE, UMECLIDINIUM BROMIDE AND VILANTEROL TRIFENATATE SCH PUFF: 100; 62.5; 25 POWDER RESPIRATORY (INHALATION) at 14:44

## 2020-11-17 RX ADMIN — DOCUSATE SODIUM SCH MG: 100 CAPSULE, LIQUID FILLED ORAL at 10:21

## 2020-11-17 RX ADMIN — PREDNISONE SCH MG: 20 TABLET ORAL at 17:48

## 2020-11-17 RX ADMIN — AZITHROMYCIN SCH MG: 250 TABLET, FILM COATED ORAL at 10:21

## 2020-11-17 RX ADMIN — PREDNISONE SCH MG: 20 TABLET ORAL at 10:21

## 2020-11-17 RX ADMIN — OXYCODONE AND ACETAMINOPHEN PRN TAB: 5; 325 TABLET ORAL at 13:28

## 2020-11-17 RX ADMIN — LISINOPRIL SCH MG: 5 TABLET ORAL at 10:21

## 2020-11-17 RX ADMIN — IPRATROPIUM BROMIDE AND ALBUTEROL SULFATE SCH ML: 2.5; .5 SOLUTION RESPIRATORY (INHALATION) at 20:55

## 2020-11-17 NOTE — PDOC PROGRESS REPORT
Subjective


Date:: 11/17/20


Subjective:: 


Patient is a 60-year-old female with a past medical history of COPD, 

Hypertension, hyperlipidemia, tobacco dependency with continuous use who is a 

poor historian but by home medication likely also has CAD and or CHF.  Patient 

was admitted 11/15/2020 with acute respiratory failure with hypoxia secondary to

COPD and CHF exacerbations.





Patient was seen on morning rounds.  She in bed, comfortably, on supplemental 

oxygen by nasal cannula at 2 L/min.  She is not home O2 dependent.  She was 

sleeping but woke easily when I said her name.  She tells me that she is feeling

little bit better today.  Does complain of fatigue and slight dyspnea, although 

improved.  She notes that she is not been out of bed yet.


She denies fever, chills, chest pain, palpitations, orthopnea, cough, abdominal 

pain, nausea vomiting diarrhea.


She has no questions or concerns at this time.


No concerns per nursing.


Reason For Visit: 


RESPIRATORY FAILURE,COPD(CHRONIC OBSTRUCTIVE PULMO








Physical Exam


Vital Signs: 


                                        











Temp Pulse Resp BP Pulse Ox


 


 97.8 F   80   18   131/71 H  99 


 


 11/17/20 16:35  11/17/20 16:35  11/17/20 16:35  11/17/20 16:35  11/17/20 16:35








                                 Intake & Output











 11/16/20 11/17/20 11/18/20





 06:59 06:59 06:59


 


Intake Total 250 1040 355


 


Output Total  2125 100


 


Balance 250 -1085 255


 


Weight  54.2 kg 











General appearance: PRESENT: no acute distress, well-developed, well-nourished


Head exam: PRESENT: atraumatic, normocephalic


Eye exam: PRESENT: conjunctiva pink, EOMI, PERRLA.  ABSENT: scleral icterus


Mouth exam: PRESENT: moist, tongue midline


Respiratory exam: PRESENT: clear to auscultation rhoda, prolonged expiratory phas,

symmetrical, unlabored, other - Omental oxygen by nasal cannula.  ABSENT: rales,

rhonchi, wheezes


Cardiovascular exam: PRESENT: RRR.  ABSENT: diastolic murmur, rubs, systolic 

murmur


Pulses: PRESENT: normal dorsalis pedis pul


Vascular exam: PRESENT: normal capillary refill


Rectal exam: PRESENT: deferred


Gentrourinary exam: PRESENT: indwelling catheter


Extremities exam: PRESENT: full ROM.  ABSENT: calf tenderness, clubbing, pedal 

edema


Neurological exam: PRESENT: alert, awake, oriented to person, oriented to place,

oriented to situation, CN II-XII grossly intact.  ABSENT: motor sensory deficit


Psychiatric exam: PRESENT: appropriate affect, normal mood.  ABSENT: homicidal 

ideation, suicidal ideation


Skin exam: PRESENT: dry, intact, warm.  ABSENT: cyanosis, rash





Results


Laboratory Results: 


                                        





                                 11/16/20 04:22 





                                 11/17/20 05:39 





                                        











  11/17/20





  05:39


 


Sodium  128.7 L


 


Potassium  3.9


 


Chloride  85 L


 


Carbon Dioxide  34 H


 


Anion Gap  10


 


BUN  29 H


 


Creatinine  1.05


 


Est GFR (African Amer)  > 60


 


Glucose  126 H


 


Calcium  9.2


 


Magnesium  1.9


 


Total Bilirubin  1.2


 


AST  19


 


Alkaline Phosphatase  92


 


Total Protein  6.8


 


Albumin  4.1








                                        











  11/15/20 11/15/20 11/15/20





  13:57 13:57 16:41


 


Creatine Kinase  66   64


 


CK-MB (CK-2)   2.05 


 


Troponin I   < 0.012 


 


NT-Pro-B Natriuret Pep   














  11/15/20





  16:41


 


Creatine Kinase 


 


CK-MB (CK-2)  2.27


 


Troponin I  < 0.012


 


NT-Pro-B Natriuret Pep  35763 H











Impressions: 


                                        





Chest X-Ray  11/15/20 13:49


IMPRESSION:  Mild interstitial pulmonary edema


 














Assessment and Plan





- Diagnosis


(1) Acute respiratory failure with hypoxemia


Is this a current diagnosis for this admission?: Yes   


Plan: 


Moderate improvement.  


ABG shows resolution of hypoxemia with respiratory alkalosis.


Presented with moderate respiratory distress ABG positive for mild hypercarbia 

and hypoxemia.  


Presented with elevated proBNP no previous labs available.


Denies any underlying cardiac history.


Endorses history of nonoxygen dependent COPD.


Endorses history of heavy tobacco abuse.


Denies any exposure to anybody with COVID-19 infection.


Chest x-ray positive for interstitial edema.


Echocardiogram pending.





Likely due to acute COPD and CHF exacerbation.  





Continue supplemental oxygen and BiPAP; begin weaning as tolerated.


Continue scheduled and as needed DuoNeb


Transition to PO azithromycin; Day 2/5


Transition to PO prednisone.


Encourage pulmonary toilet with flutter valve, incentive spirometry.








(2) Acute CHF


Qualifiers: 


   Heart failure type: diastolic   Qualified Code(s): I50.31 - Acute diastolic 

(congestive) heart failure   


Is this a current diagnosis for this admission?: Yes   


Plan: 


Denies any history of CAD but home meds are antiplatelets, ACE, beta-blockers, 

diuretics, and statins


Presented with elevated proBNP, EKG and no acute EKG changes, troponins WNL.


Echocardiogram pending.





Continue telemetry, strict in and out, cardiac diet, fluid restriction, daily 

weight.  


Continue ACE inhibitors uptitrate as possible.  


Continue beta-blockers.  


Holding furosemide r/t bump in BUN.


Consult cardiology if indicated. 








(3) Acute exacerbation of chronic obstructive pulmonary disease (COPD)


Is this a current diagnosis for this admission?: Yes   


Plan: 


History of nonoxygen dependent COPD.  Unfortunately still smokes.  


Start Trelegy.


Remaining management as per #1.








(4) Hyponatremia


Is this a current diagnosis for this admission?: Yes   


Plan: 


Improving.  Na 122-> 128


Hypervolemic hypoosmotic hyponatremia.


Likely chronic, due to underlying acute CHF exacerbation.


Continue fluid restriction, treat underlying CHF.  


Daily BMP.








(5) Tobacco abuse


Is this a current diagnosis for this admission?: Yes   


Plan: 


Counseled on quitting.  NicoDerm patch will be provided.








- Time


Time Spent with patient: 25-34 minutes


Medications reviewed and adjusted accordingly: Yes


Anticipated Discharge Disposition: Home, Self Care


Anticipated Discharge Timeframe: within 48 hours

## 2020-11-17 NOTE — XCELERA REPORT
[address] [address]

                               Tel: [phone]

                               Fax: [phone]



                      Transthoracic Echocardiogram Report

_______________________________________________________________________________



Name: ALBERT RICKETTS

MRN: [id number]                           Age: 60 yrs

Gender: Female                            : 1960

Patient Status: Inpatient                 Patient Location: [address] [place], [place] [address]

Account #: [id number]

Study Date: 2020 10:56 AM

Accession #: [id number]

_______________________________________________________________________________



Height: 62 in        Weight: 115 lb        BSA: 1.5 m2

_______________________________________________________________________________

Procedure: A two-dimensional transthoracic echocardiogram with color flow and

Doppler was performed. Study Quality: Fair.

Reason For Study: acute chf



History: CHF.

Ordering Physician: ANN ALAS



Performed By: Ela Ordaz

_______________________________________________________________________________



Interpretation Summary

The left ventricle is normal in size.

There is normal left ventricular wall thickness.

LV EF is 70%

Left ventricular systolic function is normal.

Doppler measurements suggest impaired left ventricular relaxation, which is

associated with grade I/IV or mild diastolic dysfunction

The left ventricular wall motion is normal.

There is no thrombus.

zNo ASD ,VSD ,or PFO seen.

The right ventricle is normal in size and function.

The right ventricle is not well visualized secondary to technical limitations

The right atrium is normal.

The left atrial size is normal.

There is no evidence of mitral valve prolapse.

There is no vegetation seen on the mitral valve.

There is no mitral valve stenosis.

There is a trace amount of mitral regurgitation

There is no aortic valvular vegetation.

There is no aortic valve stenosis

There is a mild amount of aortic regurgitation

There is no tricuspid stenosis.

There is a trace amount of tricuspid regurgitation

Tricuspid regurgitation jet envelope not well defined to measure RV systolic

pressure accurately.

There is no pulmonic valvular stenosis.

There is no pulmonic valvular regurgitation.

The aortic root is normal size.

The inferior vena cava appeared normal and decreased > 50% with respiration

(RAP 5-10 mmHg)

There is no pericardial effusion.



MMode/2D Measurements & Calculations

RVDd: 3.0 cm   LVIDd: 4.6 cm   FS: 47.4 %             Ao root diam: 3.2 cm



IVSd: 0.98 cm  LVIDs: 2.4 cm   EDV(Teich): 95.7 ml    Ao root area: 8.1 cm2

               LVPWd: 1.00 cm  ESV(Teich): 20.2 ml    LA dimension: 3.4 cm

                               EF(Teich): 78.9 %



Doppler Measurements & Calculations

MV E max vanda:      MV P1/2t max vanda:   Ao V2 max:         AI max vanda:

71.6 cm/sec        73.1 cm/sec         157.2 cm/sec       398.2 cm/sec

MV A max vanda:      MV P1/2t: 56.7 msec Ao max P.9 mmHgAI max P.0 cm/sec       MVA(P1/2t): 3.9 cm2                    63.4 mmHg

MV E/A: 0.62       MV dec slope:                          AI dec slope:

                                                          256.1 cm/sec2

                   377.2 cm/sec2                          AI P1/2t:

                   MV dec time:                           455.3 msec

                   0.21 sec

        _______________________________________________________________

LV V1 max PG:      PA V2 max:          AV P1/2t-pr_phl:   MV P1/2t-pr_phl:

5.4 mmHg           105.6 cm/sec        455.3 msec         56.7 msec

LV V1 max:         PA max P.5 mmHg

116.5 cm/sec





Left Ventricle

The left ventricle is normal in size. There is normal left ventricular wall

thickness. LV EF is 70%. Left ventricular systolic function is normal. Doppler

measurements suggest impaired left ventricular relaxation, which is associated

with grade I/IV or mild diastolic dysfunction. The left ventricular wall

motion is normal. There is no thrombus. zNo ASD ,VSD ,or PFO seen.



Right Ventricle

The right ventricle is normal in size and function. The right ventricle is not

well visualized secondary to technical limitations.



Atria

The right atrium is normal. The left atrial size is normal.



Mitral Valve

There is no evidence of mitral valve prolapse. There is no vegetation seen on

the mitral valve. There is no mitral valve stenosis. There is a trace amount

of mitral regurgitation.





Aortic Valve

There is no aortic valvular vegetation. There is no aortic valve stenosis.

There is a mild amount of aortic regurgitation.



Tricuspid Valve

There is no tricuspid stenosis. There is a trace amount of tricuspid

regurgitation. Tricuspid regurgitation jet envelope not well defined to

measure RV systolic pressure accurately.



Pulmonic Valve

There is no pulmonic valvular stenosis. There is no pulmonic valvular

regurgitation.



Great Vessels

The aortic root is normal size. The inferior vena cava appeared normal and

decreased > 50% with respiration (RAP 5-10 mmHg).



Effusions

There is no pericardial effusion.





_______________________________________________________________________________

_______________________________________________________________________________



Electronically signed by:      Tessa Castle      on 2020 07:12 PM



CC: ANN ALAS Lakshmi

## 2020-11-18 LAB
ANION GAP SERPL CALC-SCNC: 7 MMOL/L (ref 5–19)
BUN SERPL-MCNC: 39 MG/DL (ref 7–20)
CALCIUM: 9 MG/DL (ref 8.4–10.2)
CHLORIDE SERPL-SCNC: 84 MMOL/L (ref 98–107)
CO2 SERPL-SCNC: 35 MMOL/L (ref 22–30)
ERYTHROCYTE [DISTWIDTH] IN BLOOD BY AUTOMATED COUNT: 16.7 % (ref 11.5–14)
GLUCOSE SERPL-MCNC: 128 MG/DL (ref 75–110)
HCT VFR BLD CALC: 40.5 % (ref 36–47)
HGB BLD-MCNC: 13.7 G/DL (ref 12–15.5)
MCH RBC QN AUTO: 31.7 PG (ref 27–33.4)
MCHC RBC AUTO-ENTMCNC: 33.7 G/DL (ref 32–36)
MCV RBC AUTO: 94 FL (ref 80–97)
PLATELET # BLD: 246 10^3/UL (ref 150–450)
POTASSIUM SERPL-SCNC: 3.4 MMOL/L (ref 3.6–5)
RBC # BLD AUTO: 4.31 10^6/UL (ref 3.72–5.28)
WBC # BLD AUTO: 6.2 10^3/UL (ref 4–10.5)

## 2020-11-18 RX ADMIN — OXYCODONE AND ACETAMINOPHEN PRN TAB: 5; 325 TABLET ORAL at 00:26

## 2020-11-18 RX ADMIN — OXYCODONE AND ACETAMINOPHEN PRN TAB: 5; 325 TABLET ORAL at 06:31

## 2020-11-18 RX ADMIN — PAROXETINE HYDROCHLORIDE SCH MG: 20 TABLET, FILM COATED ORAL at 09:32

## 2020-11-18 RX ADMIN — FLUTICASONE FUROATE, UMECLIDINIUM BROMIDE AND VILANTEROL TRIFENATATE SCH PUFF: 100; 62.5; 25 POWDER RESPIRATORY (INHALATION) at 09:33

## 2020-11-18 RX ADMIN — OXYCODONE HYDROCHLORIDE PRN MG: 5 TABLET ORAL at 13:23

## 2020-11-18 RX ADMIN — ENOXAPARIN SODIUM SCH MG: 40 INJECTION SUBCUTANEOUS at 09:33

## 2020-11-18 RX ADMIN — OXYCODONE AND ACETAMINOPHEN PRN TAB: 5; 325 TABLET ORAL at 13:23

## 2020-11-18 RX ADMIN — FAMOTIDINE SCH MG: 20 TABLET, FILM COATED ORAL at 09:32

## 2020-11-18 RX ADMIN — PREDNISONE SCH MG: 20 TABLET ORAL at 09:32

## 2020-11-18 RX ADMIN — AZITHROMYCIN SCH MG: 250 TABLET, FILM COATED ORAL at 09:33

## 2020-11-18 RX ADMIN — OXYCODONE AND ACETAMINOPHEN PRN TAB: 5; 325 TABLET ORAL at 21:55

## 2020-11-18 RX ADMIN — IPRATROPIUM BROMIDE AND ALBUTEROL SULFATE SCH ML: 2.5; .5 SOLUTION RESPIRATORY (INHALATION) at 13:30

## 2020-11-18 RX ADMIN — ATORVASTATIN CALCIUM SCH MG: 40 TABLET, FILM COATED ORAL at 09:33

## 2020-11-18 RX ADMIN — METOPROLOL SUCCINATE SCH MG: 25 TABLET, EXTENDED RELEASE ORAL at 09:32

## 2020-11-18 RX ADMIN — LISINOPRIL SCH MG: 5 TABLET ORAL at 09:32

## 2020-11-18 RX ADMIN — CLOPIDOGREL BISULFATE SCH MG: 75 TABLET, FILM COATED ORAL at 09:32

## 2020-11-18 RX ADMIN — DOCUSATE SODIUM SCH MG: 100 CAPSULE, LIQUID FILLED ORAL at 09:32

## 2020-11-18 RX ADMIN — OXYCODONE HYDROCHLORIDE PRN MG: 5 TABLET ORAL at 19:59

## 2020-11-18 RX ADMIN — IPRATROPIUM BROMIDE AND ALBUTEROL SULFATE SCH ML: 2.5; .5 SOLUTION RESPIRATORY (INHALATION) at 08:23

## 2020-11-18 RX ADMIN — FAMOTIDINE SCH MG: 20 TABLET, FILM COATED ORAL at 21:55

## 2020-11-18 NOTE — PDOC PROGRESS REPORT
Subjective


Date:: 11/18/20


Subjective:: 


Patient is a 60-year-old female with a past medical history of COPD, 

Hypertension, hyperlipidemia, tobacco dependency with continuous use who is a 

poor historian but by home medication likely also has CAD and or CHF.  Patient 

was admitted 11/15/2020 with acute respiratory failure with hypoxia secondary to

COPD and CHF exacerbations.





Patient was seen on morning rounds.  She in bed, comfortably, on supplemental 

oxygen by nasal cannula at 1 L/min.  She is not home O2 dependent.  She reports 

that she is feeling much better today.  She is hopeful to go home.  She does 

complain of chronic back pain and asks for additional medication to ease her 

discomfort.  She tells me that at home she uses over-the-counter Tylenol or 

Motrin in addition to her prescribed Percocet.  She is agreeable to trial of 

Tylenol, Lidoderm patch, and heating pad.  She is encouraged to ambulate as this

will also help her discomfort.


She denies fever, chills, chest pain, palpitations, dyspnea, orthopnea, cough, 

abdominal pain, nausea vomiting diarrhea.


She has no other questions or concerns at this time.


No concerns per nursing.


Reason For Visit: 


RESPIRATORY FAILURE,COPD(CHRONIC OBSTRUCTIVE PULMO








Physical Exam


Vital Signs: 


                                        











Temp Pulse Resp BP Pulse Ox


 


 98.0 F   86   14   143/61 H  98 


 


 11/18/20 11:36  11/18/20 14:00  11/18/20 13:30  11/18/20 11:36  11/18/20 13:30








                                 Intake & Output











 11/17/20 11/18/20 11/19/20





 06:59 06:59 06:59


 


Intake Total 2030 795 7056


 


Output Total 2125 300 


 


Balance -6044 465 3039


 


Weight 54.2 kg 53.6 kg 











General appearance: PRESENT: no acute distress, well-developed, well-nourished


Head exam: PRESENT: atraumatic, normocephalic


Eye exam: PRESENT: conjunctiva pink, EOMI, PERRLA.  ABSENT: scleral icterus


Mouth exam: PRESENT: moist, tongue midline


Teeth exam: PRESENT: poor dentation


Respiratory exam: PRESENT: clear to auscultation rhoda, prolonged expiratory phas,

symmetrical, unlabored.  ABSENT: rales, rhonchi, wheezes


Cardiovascular exam: PRESENT: RRR.  ABSENT: diastolic murmur, rubs, systolic 

murmur


Pulses: PRESENT: normal dorsalis pedis pul


Vascular exam: PRESENT: normal capillary refill


Rectal exam: PRESENT: deferred


Extremities exam: PRESENT: full ROM.  ABSENT: calf tenderness, clubbing, pedal 

edema, +1 edema


Musculoskeletal exam: PRESENT: ambulatory


Neurological exam: PRESENT: alert, awake, oriented to person, oriented to place,

oriented to time, oriented to situation, CN II-XII grossly intact.  ABSENT: 

motor sensory deficit


Psychiatric exam: PRESENT: appropriate affect, normal mood.  ABSENT: homicidal 

ideation, suicidal ideation


Skin exam: PRESENT: dry, intact, warm.  ABSENT: cyanosis, rash





Results


Laboratory Results: 


                                        





                                 11/18/20 06:23 





                                 11/18/20 06:23 





                                        











  11/18/20 11/18/20





  06:23 06:23


 


WBC  6.2 


 


RBC  4.31 


 


Hgb  13.7 


 


Hct  40.5 


 


MCV  94 


 


MCH  31.7 


 


MCHC  33.7 


 


RDW  16.7 H 


 


Plt Count  246 


 


Sodium   126.4 L


 


Potassium   3.4 L


 


Chloride   84 L


 


Carbon Dioxide   35 H


 


Anion Gap   7


 


BUN   39 H


 


Creatinine   1.15


 


Est GFR (African Amer)   58 L


 


Glucose   128 H


 


Calcium   9.0








                                        











  11/15/20 11/15/20 11/15/20





  13:57 13:57 16:41


 


Creatine Kinase  66   64


 


CK-MB (CK-2)   2.05 


 


Troponin I   < 0.012 


 


NT-Pro-B Natriuret Pep   














  11/15/20 11/18/20





  16:41 06:23


 


Creatine Kinase  


 


CK-MB (CK-2)  2.27 


 


Troponin I  < 0.012 


 


NT-Pro-B Natriuret Pep  94212 H  718 H











Impressions: 


                                        





Chest X-Ray  11/15/20 13:49


IMPRESSION:  Mild interstitial pulmonary edema


 














Assessment and Plan





- Diagnosis


(1) Acute respiratory failure with hypoxemia


Is this a current diagnosis for this admission?: Yes   


Plan: 


Resolved; maintaining oxygen saturations on room air.


ABG shows resolution of hypoxemia with respiratory alkalosis.


Presented with moderate respiratory distress ABG positive for mild hypercarbia 

and hypoxemia.  


Presented with elevated proBNP no previous labs available.


Denies any underlying cardiac history.


Endorses history of nonoxygen dependent COPD.


Endorses history of heavy tobacco abuse.


Denies any exposure to anybody with COVID-19 infection.


Chest x-ray positive for interstitial edema.


Echocardiogram pending.





Likely due to acute COPD exacerbation.  





Continue supplemental oxygen as needed.


Continue as needed DuoNeb


Continue PO azithromycin; Day 3/5


Continue PO prednisone.


Encourage pulmonary toilet with flutter valve, incentive spirometry.








(2) Acute exacerbation of chronic obstructive pulmonary disease (COPD)


Is this a current diagnosis for this admission?: Yes   


Plan: 


Improved.


History of nonoxygen dependent COPD.  Unfortunately still smokes.  


Continue Trelegy.


Remaining management as per #1.








(3) Acute CHF


Qualifiers: 


   Heart failure type: diastolic   Qualified Code(s): I50.31 - Acute diastolic 

(congestive) heart failure   


Is this a current diagnosis for this admission?: Yes   


Plan: 


Resolved.


Denies any history of CAD but home meds are antiplatelets, ACE, beta-blockers, 

diuretics, and statins


Presented with elevated proBNP, EKG and no acute EKG changes, troponins WNL.


Echocardiogram revealed LVEF 70% mild diastolic dysfunction.





Continue telemetry, strict I&O, cardiac diet, daily weight.  


Continue ACE inhibitor.  


Continue beta-blockers.  


Consult cardiology if indicated. 








(4) Hyponatremia


Is this a current diagnosis for this admission?: Yes   


Plan: 


Improving.  Na 122-> 128-> 126


Hypervolemic hypoosmotic hyponatremia.


Holding diuretics.


1L NS today


Liberalize dietary sodium


Follow up BMP.








(5) Tobacco abuse


Is this a current diagnosis for this admission?: Yes   


Plan: 


Counseled on quitting.  NicoDerm patch will be provided.








(6) Hypokalemia


Is this a current diagnosis for this admission?: Yes   


Plan: 


Likely secondary to diuretic use.


IV replacement


Follow up chemistry.








- Time


Time Spent with patient: 35 or more minutes


Medications reviewed and adjusted accordingly: Yes


Anticipated Discharge Disposition: Home, Self Care


Anticipated Discharge Timeframe: within 24 hours

## 2020-11-19 VITALS — SYSTOLIC BLOOD PRESSURE: 142 MMHG | DIASTOLIC BLOOD PRESSURE: 66 MMHG

## 2020-11-19 LAB
ANION GAP SERPL CALC-SCNC: 8 MMOL/L (ref 5–19)
BUN SERPL-MCNC: 28 MG/DL (ref 7–20)
CALCIUM: 8.6 MG/DL (ref 8.4–10.2)
CHLORIDE SERPL-SCNC: 92 MMOL/L (ref 98–107)
CO2 SERPL-SCNC: 29 MMOL/L (ref 22–30)
GLUCOSE SERPL-MCNC: 93 MG/DL (ref 75–110)
OSMOLALITY,URINE: 234 MOSM/KG (ref 300–900)
POTASSIUM SERPL-SCNC: 3.5 MMOL/L (ref 3.6–5)
URINE SODIUM: 28 MMOL/L (ref 30–90)

## 2020-11-19 PROCEDURE — 3E02340 INTRODUCTION OF INFLUENZA VACCINE INTO MUSCLE, PERCUTANEOUS APPROACH: ICD-10-PCS | Performed by: INTERNAL MEDICINE

## 2020-11-19 RX ADMIN — FAMOTIDINE SCH MG: 20 TABLET, FILM COATED ORAL at 09:21

## 2020-11-19 RX ADMIN — PAROXETINE HYDROCHLORIDE SCH MG: 20 TABLET, FILM COATED ORAL at 09:21

## 2020-11-19 RX ADMIN — OXYCODONE HYDROCHLORIDE PRN MG: 5 TABLET ORAL at 04:16

## 2020-11-19 RX ADMIN — ENOXAPARIN SODIUM SCH MG: 40 INJECTION SUBCUTANEOUS at 09:28

## 2020-11-19 RX ADMIN — PREDNISONE SCH MG: 20 TABLET ORAL at 09:23

## 2020-11-19 RX ADMIN — FLUTICASONE FUROATE, UMECLIDINIUM BROMIDE AND VILANTEROL TRIFENATATE SCH PUFF: 100; 62.5; 25 POWDER RESPIRATORY (INHALATION) at 09:23

## 2020-11-19 RX ADMIN — AZITHROMYCIN SCH MG: 250 TABLET, FILM COATED ORAL at 09:21

## 2020-11-19 RX ADMIN — OXYCODONE AND ACETAMINOPHEN PRN TAB: 5; 325 TABLET ORAL at 06:09

## 2020-11-19 RX ADMIN — ATORVASTATIN CALCIUM SCH MG: 40 TABLET, FILM COATED ORAL at 07:52

## 2020-11-19 RX ADMIN — LISINOPRIL SCH MG: 5 TABLET ORAL at 09:22

## 2020-11-19 RX ADMIN — DOCUSATE SODIUM SCH MG: 100 CAPSULE, LIQUID FILLED ORAL at 09:21

## 2020-11-19 RX ADMIN — CLOPIDOGREL BISULFATE SCH MG: 75 TABLET, FILM COATED ORAL at 09:22

## 2020-11-19 RX ADMIN — METOPROLOL SUCCINATE SCH MG: 25 TABLET, EXTENDED RELEASE ORAL at 09:22

## 2020-11-19 NOTE — PDOC DISCHARGE SUMMARY
Impression





- Admit/DC Date/PCP


Admission Date/Primary Care Provider: 


  11/15/20 18:52





  





Discharge Date: 11/19/20





- Discharge Diagnosis


(1) Acute respiratory failure with hypoxemia


Is this a current diagnosis for this admission?: Yes   





(2) Acute exacerbation of chronic obstructive pulmonary disease (COPD)


Is this a current diagnosis for this admission?: Yes   





(3) Acute CHF


Is this a current diagnosis for this admission?: Yes   





(4) Hyponatremia


Is this a current diagnosis for this admission?: Yes   





(5) Tobacco abuse


Is this a current diagnosis for this admission?: Yes   





(6) Hypokalemia


Is this a current diagnosis for this admission?: Yes   





- Additional Information


Discharge Diet: Regular, Other (Comments)


Discharge Activity: Activity As Tolerated, Balance Activity w/Rest, Energy 

Conservation, Weigh Daily


Prescriptions: 


Prednisone [Deltasone 20 mg Tablet] 60 mg PO DAILY #9 tablet


Lisinopril [Prinivil] 20 mg PO DAILY #30 tablet


Metoprolol Succinate [Toprol Xl 25 mg Tab.sr] 25 mg PO DAILY #30 tab.sr.24h


Fluticasone/Umeclidin/Vilanter [Trelegy 100-62.5-25 Mcg Ellipta 14 Dose/Dpi] 1 

inh IH DAILY #1 inhaler


Home Medications: 








Albuterol Sulfate [Proair HFA Inhalation Aerosol 8.5 gm MDI] 2 puff IH Q6 

11/16/20 


Amlodipine Besylate [Norvasc 10 mg Tablet] 10 mg PO DAILY 11/16/20 


Atorvastatin Calcium [Lipitor 40 mg Tablet] 40 mg PO QAM 11/16/20 


Clopidogrel Bisulfate [Plavix 75 mg Tablet] 75 mg PO DAILY 11/16/20 


Furosemide [Lasix 20 mg Tablet] 20 mg PO DAILYP PRN 11/16/20 


Oxycodone HCl/Acetaminophen [Percocet  mg Tablet] 1 tab PO Q6HP PRN 

11/16/20 


Paroxetine HCl [Paxil 20 mg Tablet] 20 mg PO DAILY 11/16/20 


Umeclidinium Brm/Vilanterol Tr [Anoro Ellipta 62.5-25 Mcg INH] 1 puff IH DAILY 

11/16/20 


Acetaminophen [Tylenol 325 mg Tablet] 325 mg PO Q4HP PRN  tablet 11/19/20 


Fluticasone/Umeclidin/Vilanter [Trelegy 100-62.5-25 Mcg Ellipta 14 Dose/Dpi] 1 

inh IH DAILY #1 inhaler 11/19/20 


Lisinopril [Prinivil] 20 mg PO DAILY #30 tablet 11/19/20 


Metoprolol Succinate [Toprol Xl 25 mg Tab.sr] 25 mg PO DAILY #30 tab.sr.24h 

11/19/20 


Prednisone [Deltasone 20 mg Tablet] 60 mg PO DAILY #9 tablet 11/19/20 











History of Present Illiness


History of Present Illness: 


Per H&P by Dr. Phillips: ALBERT RICKETTS is a 60 year old female past medical 

history of COPD, who was brought to ED by EMS from [place] [id number] where she is 

staying with her sister, after noted to be short of breath, unfortunately  

patient poor historian, does not provide much information except for that she is

moving to [place], and she has COPD and history of drug abuse, she denies any 

cardiac history, and she was bought to ED because she was short of breath, as 

per EMS report patient was noted to be in moderate respiratory distress, well to

maintain her O2, she was placed on CPAP and given nebulizer treatment, started 

on Solu-Medrol and IV magnesium, and brought to ED.  In ED she was noted to have

mild hypercarbia and hypoxemia, with elevated proBNP this x-ray showed mild 

interstitial pulmonary edema.  Patient denies any fever, chills, chest pain, 

nausea, vomiting, diarrhea, constipation, exposure to anybody with COVID-19 

infection.  Noted to have elevated proBNP however denies any history of CHF.





Hospital Course


Hospital Course: 


(1) Acute respiratory failure with hypoxemia


Resolved; maintaining oxygen saturations on room air.


ABG shows resolution of hypoxemia with respiratory alkalosis.


Presented with moderate respiratory distress ABG positive for mild hypercarbia 

and hypoxemia.  


Presented with elevated proBNP no previous labs available.


Denies any underlying cardiac history.


Endorses history of nonoxygen dependent COPD.


Endorses history of heavy tobacco abuse.


Denies any exposure to anybody with COVID-19 infection.


Chest x-ray positive for interstitial edema.


Echocardiogram pending.





Likely due to acute COPD exacerbation.  





The patient was empirically placed on p.o. azithromycin.  She was further 

supported with supplemental oxygen, scheduled and as needed nebulizer 

treatments, Mucinex, and prednisone.  Pulmonary toilet was encouraged with 

incentive spirometer, flutter valve, and early ambulation.





(2) Acute exacerbation of chronic obstructive pulmonary disease (COPD)


Resolved.


History of nonoxygen dependent COPD.  Unfortunately still smokes.  


Continue Trelegy.


Remaining management as per #1.





(3) Acute CHF


Resolved.


Denies any history of CAD but home meds are antiplatelets, ACE, beta-blockers, 

diuretics, and statins


Presented with elevated proBNP, EKG and no acute EKG changes, troponins WNL.


Echocardiogram revealed LVEF 70% mild diastolic dysfunction.





Discharged on Toprol XL, Lisinopril, and her home dose furosemide.


She states she is moving out of state tomorrow; she is encouraged to establish 

with a PCP immediate upon arrival to her new home.





(4) Hyponatremia


Chronic and at baseline.


Improving.  Na 122-> 128-> 126-> 12.9


Urine Osmolarity and Urine Na checked; confirm Hypovolemic hyponatremia.





Diuretics were stopped; received 1L NS 


Liberalize dietary sodium


Discussed with patient.


Of note; the patient has two accounts at [facility].  When reviewing labs in the other 

account (Albert Hunt, 1960), it becomes readily apparent that this 

has been a chronic issue over the span of several years and she is currently at 

her baseline. 


I have asked nursing to notify the appropriate department to merge the accounts.





(5) Tobacco abuse


Counseled on quitting.  NicoDerm patch will be provided.





(6) Hypokalemia


Improved


Likely secondary to diuretic use.


Received IV and oral replacement





Physical Exam


Vital Signs: 


                                        











Temp Pulse Resp BP Pulse Ox


 


 98.8 F   57 L  16   142/66 H  97 


 


 11/19/20 12:59  11/19/20 12:59  11/19/20 12:59  11/19/20 12:59  11/19/20 12:59








                                 Intake & Output











 11/18/20 11/19/20 11/20/20





 06:59 06:59 06:59


 


Intake Total 606 1740 


 


Output Total 300 1350 


 


Balance 306 390 


 


Weight 53.6 kg 55.3 kg 











General appearance: PRESENT: no acute distress, cooperative, disheveled, thin, 

well-developed, well-nourished


Head exam: PRESENT: atraumatic, normocephalic


Eye exam: PRESENT: conjunctiva pink, EOMI, PERRLA.  ABSENT: scleral icterus


Mouth exam: PRESENT: moist, tongue midline


Teeth exam: PRESENT: poor dentation


Respiratory exam: PRESENT: clear to auscultation rhoda, symmetrical, unlabored, 

other - room air.  ABSENT: rales, rhonchi, wheezes


Cardiovascular exam: PRESENT: RRR, +S1, +S2.  ABSENT: diastolic murmur, rubs, 

systolic murmur


Pulses: PRESENT: normal dorsalis pedis pul


Vascular exam: PRESENT: normal capillary refill


Extremities exam: PRESENT: full ROM.  ABSENT: calf tenderness, clubbing, pedal 

edema


Musculoskeletal exam: PRESENT: ambulatory - on room air


Neurological exam: PRESENT: alert, awake, oriented to person, oriented to place,

oriented to time, oriented to situation, CN II-XII grossly intact.  ABSENT: 

motor sensory deficit


Psychiatric exam: PRESENT: appropriate affect, normal mood.  ABSENT: homicidal 

ideation, suicidal ideation


Skin exam: PRESENT: dry, intact, warm.  ABSENT: cyanosis, rash





Results


Laboratory Results: 


                                        











WBC  6.2 10^3/uL (4.0-10.5)   11/18/20  06:23    


 


RBC  4.31 10^6/uL (3.72-5.28)   11/18/20  06:23    


 


Hgb  13.7 g/dL (12.0-15.5)   11/18/20  06:23    


 


Hct  40.5 % (36.0-47.0)   11/18/20  06:23    


 


MCV  94 fl (80-97)   11/18/20  06:23    


 


MCH  31.7 pg (27.0-33.4)   11/18/20  06:23    


 


MCHC  33.7 g/dL (32.0-36.0)   11/18/20  06:23    


 


RDW  16.7 % (11.5-14.0)  H  11/18/20  06:23    


 


Plt Count  246 10^3/uL (150-450)   11/18/20  06:23    


 


Lymph % (Auto)  6.4 % (13-45)  L  11/16/20  04:22    


 


Mono % (Auto)  4.4 % (3-13)   11/16/20  04:22    


 


Eos % (Auto)  0.0 % (0-6)   11/16/20  04:22    


 


Baso % (Auto)  0.2 % (0-2)   11/16/20  04:22    


 


Absolute Neuts (auto)  5.5 10^3/uL (1.7-8.2)   11/16/20  04:22    


 


Absolute Lymphs (auto)  0.4 10^3/uL (0.5-4.7)  L  11/16/20  04:22    


 


Absolute Monos (auto)  0.3 10^3/uL (0.1-1.4)   11/16/20  04:22    


 


Absolute Eos (auto)  0.0 10^3/uL (0.0-0.6)   11/16/20  04:22    


 


Absolute Basos (auto)  0.0 10^3/uL (0.0-0.2)   11/16/20  04:22    


 


Seg Neutrophils %  89.0 % (42-78)  H  11/16/20  04:22    


 


PT  12.7 SEC (11.4-15.4)   11/15/20  13:57    


 


INR  0.93   11/15/20  13:57    


 


APTT  32.1 SEC (23.5-35.8)   11/15/20  13:57    


 


Carbonic Acid  0.98 mmol/L (1.05-1.35)  L  11/16/20  09:50    


 


HCO3/H2CO3 Ratio  30:1   11/16/20  09:50    


 


ABG pH  7.59  (7.35-7.45)  H  11/16/20  09:50    


 


ABG pCO2  32.7 mmHg (35-45)  L  11/16/20  09:50    


 


ABG pO2  272.9 mmHg ()  H  11/16/20  09:50    


 


ABG HCO3  30.3 mmol/L (20-24)  H  11/16/20  09:50    


 


ABG Total CO2  31.3 mmol/L (21-25)  H  11/16/20  09:50    


 


ABG O2 Saturation  99.7 % (94-98)  H  11/16/20  09:50    


 


ABG Base Excess  8.5 mmol/L  11/16/20  09:50    


 


FiO2  60%   11/16/20  09:50    


 


Sodium  129.2 mmol/L (137-145)  L  11/19/20  05:55    


 


Potassium  3.5 mmol/L (3.6-5.0)  L  11/19/20  05:55    


 


Chloride  92 mmol/L ()  L  11/19/20  05:55    


 


Carbon Dioxide  29 mmol/L (22-30)   11/19/20  05:55    


 


Anion Gap  8  (5-19)   11/19/20  05:55    


 


BUN  28 mg/dL (7-20)  H  11/19/20  05:55    


 


Creatinine  0.96 mg/dL (0.52-1.25)   11/19/20  05:55    


 


Est GFR ( Amer)  > 60  (>60)   11/19/20  05:55    


 


Est GFR (MDRD) Non-Af  59  (>60)  L  11/19/20  05:55    


 


Glucose  93 mg/dL ()   11/19/20  05:55    


 


POC Glucose  184 mg/dL ()  H  11/15/20  14:01    


 


Hemoglobin A1c %  5.0 % (4.7-6.0)   11/16/20  04:22    


 


Calcium  8.6 mg/dL (8.4-10.2)   11/19/20  05:55    


 


Magnesium  1.9 mg/dL (1.6-2.3)   11/17/20  05:39    


 


Total Bilirubin  1.2 mg/dL (0.2-1.3)   11/17/20  05:39    


 


Direct Bilirubin  0.5 mg/dL (0.0-0.4)  H  11/17/20  05:39    


 


Neonat Total Bilirubin  Not Reportable   11/17/20  05:39    


 


Neonat Direct Bilirubin  Not Reportable   11/17/20  05:39    


 


Neonat Indirect Bili  Not Reportable   11/17/20  05:39    


 


AST  19 U/L (14-36)   11/17/20  05:39    


 


ALT  10 U/L (<35)   11/17/20  05:39    


 


Alkaline Phosphatase  92 U/L ()   11/17/20  05:39    


 


Creatine Kinase  64 U/L ()   11/15/20  16:41    


 


CK-MB (CK-2)  2.27 ng/mL (<4.55)   11/15/20  16:41    


 


Troponin I  < 0.012 ng/mL  11/15/20  16:41    


 


NT-Pro-B Natriuret Pep  718 pg/mL (<125)  H  11/18/20  06:23    


 


Total Protein  6.8 g/dL (6.3-8.2)   11/17/20  05:39    


 


Albumin  4.1 g/dL (3.5-5.0)   11/17/20  05:39    


 


Triglycerides  111 mg/dL (<150)   11/16/20  04:22    


 


Cholesterol  107.70 mg/dL (0-200)   11/16/20  04:22    


 


LDL Cholesterol Direct  < 30 mg/dL (<100)   11/16/20  04:22    


 


VLDL Cholesterol  22.2 mg/dL (10-31)   11/16/20  04:22    


 


HDL Cholesterol  63 mg/dL (>40)   11/16/20  04:22    


 


TSH  0.33 uIU/mL (0.47-4.68)  L  11/16/20  04:22    


 


Free T4  1.44 ng/dL (0.78-2.19)   11/16/20  04:22    


 


Urine Color  YELLOW   11/15/20  16:00    


 


Urine Appearance  CLEAR   11/15/20  16:00    


 


Urine pH  5.0  (5.0-9.0)   11/15/20  16:00    


 


Ur Specific Gravity  1.010   11/15/20  16:00    


 


Urine Protein  NEGATIVE mg/dL (NEGATIVE)   11/15/20  16:00    


 


Urine Glucose (UA)  NEGATIVE mg/dL (NEGATIVE)   11/15/20  16:00    


 


Urine Ketones  NEGATIVE mg/dL (NEGATIVE)   11/15/20  16:00    


 


Urine Blood  SMALL  (NEGATIVE)  H  11/15/20  16:00    


 


Urine Nitrite (Reflex)  NEGATIVE  (NEGATIVE)   11/15/20  16:00    


 


Urine Bilirubin  NEGATIVE  (NEGATIVE)   11/15/20  16:00    


 


Urine Urobilinogen  NEGATIVE mg/dL (<2.0)   11/15/20  16:00    


 


Leukocyte Esterase Rfl  NEGATIVE  (NEGATIVE)   11/15/20  16:00    


 


Urine RBC (Auto)  1 /HPF  11/15/20  16:00    


 


Urine WBC (Reflex)  < 1 /HPF  11/15/20  16:00    


 


Squamous Epi Cells Auto  <1 /HPF  11/15/20  16:00    


 


Urine Mucus (Auto)  RARE /LPF  11/15/20  16:00    


 


Urine Osmolality  234 mOsm/kg (300-900)  L  11/19/20  08:21    


 


Urine Sodium  28 mmol/L (30-90)  L  11/19/20  08:21    


 


Urine Ascorbic Acid  NEGATIVE  (NEGATIVE)   11/15/20  16:00    


 


Urine Opiates Screen  UNCONFIRMED POSITIVE   11/15/20  16:00    


 


Urine Methadone Screen  NEGATIVE   11/15/20  16:00    


 


Ur Barbiturates Screen  NEGATIVE   11/15/20  16:00    


 


Ur Phencyclidine Scrn  NEGATIVE   11/15/20  16:00    


 


Ur Amphetamines Screen  NEGATIVE   11/15/20  16:00    


 


U Benzodiazepines Scrn  NEGATIVE   11/15/20  16:00    


 


Urine Cocaine Screen  NEGATIVE   11/15/20  16:00    


 


U Marijuana (THC) Screen  NEGATIVE   11/15/20  16:00    








                                        











  11/15/20 11/15/20 11/18/20





  13:57 16:41 06:23


 


CK-MB (CK-2)  2.05  2.27 


 


Troponin I  < 0.012  < 0.012 


 


NT-Pro-B Natriuret Pep   90116 H  718 H











Impressions: 


                                        





Chest X-Ray  11/15/20 13:49


IMPRESSION:  Mild interstitial pulmonary edema


 














Plan


Plan of Treatment: 


Patient is discharged home in stable condition.


She is advised follow-up with her primary care provider within 1 week.


She is instructed to take her medications as prescribed.


She is recommended to reduce water intake;  less than 2 L daily.  Recommend she 

liberalize dietary sodium.


She is instructed to avoid tobacco use and heavy alcohol use.


She is encouraged to return to the emergency department, as needed, for 

concerning symptoms.


Time Spent: Greater than 30 Minutes





Stroke


Is this a Stroke Patient?: No





Acute Heart Failure


Is this a Heart Failure Patient?: No